# Patient Record
Sex: MALE | Race: BLACK OR AFRICAN AMERICAN | Employment: FULL TIME | URBAN - METROPOLITAN AREA
[De-identification: names, ages, dates, MRNs, and addresses within clinical notes are randomized per-mention and may not be internally consistent; named-entity substitution may affect disease eponyms.]

---

## 2019-01-04 ENCOUNTER — HOSPITAL ENCOUNTER (OUTPATIENT)
Dept: OCCUPATIONAL MEDICINE | Age: 34
Discharge: HOME | End: 2019-01-04
Attending: FAMILY MEDICINE
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
## 2019-10-01 ENCOUNTER — OFFICE VISIT (OUTPATIENT)
Dept: FAMILY MEDICINE CLINIC | Age: 34
End: 2019-10-01

## 2019-10-01 ENCOUNTER — HOME HEALTH ADMISSION (OUTPATIENT)
Dept: HOME HEALTH SERVICES | Facility: HOME HEALTH | Age: 34
End: 2019-10-01
Payer: MEDICAID

## 2019-10-01 VITALS
HEIGHT: 73 IN | SYSTOLIC BLOOD PRESSURE: 128 MMHG | WEIGHT: 185 LBS | OXYGEN SATURATION: 98 % | RESPIRATION RATE: 16 BRPM | DIASTOLIC BLOOD PRESSURE: 83 MMHG | BODY MASS INDEX: 24.52 KG/M2 | TEMPERATURE: 98.2 F | HEART RATE: 75 BPM

## 2019-10-01 DIAGNOSIS — M25.512 ACUTE PAIN OF LEFT SHOULDER: ICD-10-CM

## 2019-10-01 DIAGNOSIS — M79.604 BILATERAL LEG PAIN: ICD-10-CM

## 2019-10-01 DIAGNOSIS — M79.605 BILATERAL LEG PAIN: ICD-10-CM

## 2019-10-01 DIAGNOSIS — T07.XXXA MULTIPLE ABRASIONS: Primary | ICD-10-CM
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
## 2019-10-01 DIAGNOSIS — S49.92XA INJURY OF LEFT CLAVICLE, INITIAL ENCOUNTER: ICD-10-CM

## 2019-10-01 RX ORDER — CYCLOBENZAPRINE HCL 10 MG
10 TABLET ORAL
Qty: 60 TAB | Refills: 1 | Status: SHIPPED | OUTPATIENT
Start: 2019-10-01

## 2019-10-01 RX ORDER — NAPROXEN 500 MG/1
500 TABLET ORAL
COMMUNITY
Start: 2019-09-27 | End: 2019-10-01 | Stop reason: ALTCHOICE

## 2019-10-01 RX ORDER — IBUPROFEN 800 MG/1
800 TABLET ORAL
Qty: 90 TAB | Refills: 0 | Status: SHIPPED | OUTPATIENT
Start: 2019-10-01 | End: 2019-10-16 | Stop reason: SDUPTHER

## 2019-10-01 RX ORDER — CLINDAMYCIN HYDROCHLORIDE 300 MG/1
300 CAPSULE ORAL 3 TIMES DAILY
Qty: 30 CAP | Refills: 0 | Status: SHIPPED | OUTPATIENT
Start: 2019-10-01 | End: 2019-10-11

## 2019-10-01 RX ORDER — OXYCODONE AND ACETAMINOPHEN 5; 325 MG/1; MG/1
1 TABLET ORAL
COMMUNITY
Start: 2019-09-27 | End: 2019-10-01 | Stop reason: SDUPTHER

## 2019-10-01 RX ORDER — OXYCODONE AND ACETAMINOPHEN 5; 325 MG/1; MG/1
1 TABLET ORAL
Qty: 56 TAB | Refills: 0 | Status: SHIPPED | OUTPATIENT
Start: 2019-10-01 | End: 2019-10-16 | Stop reason: SDUPTHER

## 2019-10-01 RX ORDER — CYCLOBENZAPRINE HCL 10 MG
10 TABLET ORAL
COMMUNITY
Start: 2019-09-27 | End: 2019-10-01 | Stop reason: SDUPTHER
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
## 2019-10-02 ENCOUNTER — HOME CARE VISIT (OUTPATIENT)
Dept: SCHEDULING | Facility: HOME HEALTH | Age: 34
End: 2019-10-02
Payer: MEDICAID

## 2019-10-02 ENCOUNTER — TELEPHONE (OUTPATIENT)
Dept: FAMILY MEDICINE CLINIC | Age: 34
End: 2019-10-02

## 2019-10-02 PROCEDURE — 400013 HH SOC

## 2019-10-02 PROCEDURE — G0299 HHS/HOSPICE OF RN EA 15 MIN: HCPCS

## 2019-10-02 NOTE — TELEPHONE ENCOUNTER
Patient calling stated at his appointment yesterday he gave the nurse a fax number to have his fmla paperwork faxed over to his job. Patient is checking on status of his paperwork.     pls advise

## 2019-10-03 ENCOUNTER — HOME CARE VISIT (OUTPATIENT)
Dept: HOME HEALTH SERVICES | Facility: HOME HEALTH | Age: 34
End: 2019-10-03
Payer: MEDICAID

## 2019-10-03 PROCEDURE — A4452 WATERPROOF TAPE: HCPCS

## 2019-10-03 PROCEDURE — A6248 HYDROGEL DRSG GEL FILLER: HCPCS

## 2019-10-03 PROCEDURE — A6212 FOAM DRG <=16 SQ IN W/BORDER: HCPCS

## 2019-10-03 PROCEDURE — A6260 WOUND CLEANSER ANY TYPE/SIZE: HCPCS

## 2019-10-03 PROCEDURE — A6216 NON-STERILE GAUZE<=16 SQ IN: HCPCS

## 2019-10-04 ENCOUNTER — HOME CARE VISIT (OUTPATIENT)
Dept: SCHEDULING | Facility: HOME HEALTH | Age: 34
End: 2019-10-04
Payer: MEDICAID

## 2019-10-04 PROCEDURE — G0300 HHS/HOSPICE OF LPN EA 15 MIN: HCPCS

## 2019-10-06 VITALS
DIASTOLIC BLOOD PRESSURE: 70 MMHG | TEMPERATURE: 98.1 F | RESPIRATION RATE: 16 BRPM | OXYGEN SATURATION: 98 % | SYSTOLIC BLOOD PRESSURE: 122 MMHG | HEART RATE: 80 BPM

## 2019-10-08 ENCOUNTER — OFFICE VISIT (OUTPATIENT)
Dept: FAMILY MEDICINE CLINIC | Age: 34
End: 2019-10-08

## 2019-10-08 ENCOUNTER — HOME CARE VISIT (OUTPATIENT)
Dept: SCHEDULING | Facility: HOME HEALTH | Age: 34
End: 2019-10-08
Payer: MEDICAID

## 2019-10-08 VITALS
HEIGHT: 73 IN | TEMPERATURE: 98.1 F | BODY MASS INDEX: 26.77 KG/M2 | WEIGHT: 202 LBS | RESPIRATION RATE: 16 BRPM | DIASTOLIC BLOOD PRESSURE: 87 MMHG | SYSTOLIC BLOOD PRESSURE: 125 MMHG | HEART RATE: 79 BPM | OXYGEN SATURATION: 98 %

## 2019-10-08 VITALS
DIASTOLIC BLOOD PRESSURE: 78 MMHG | OXYGEN SATURATION: 98 % | HEART RATE: 66 BPM | TEMPERATURE: 98 F | SYSTOLIC BLOOD PRESSURE: 108 MMHG

## 2019-10-08 DIAGNOSIS — M79.604 BILATERAL LEG PAIN: ICD-10-CM

## 2019-10-08 DIAGNOSIS — S49.92XD INJURY OF LEFT CLAVICLE, SUBSEQUENT ENCOUNTER: ICD-10-CM

## 2019-10-08 DIAGNOSIS — M25.512 ACUTE PAIN OF LEFT SHOULDER: ICD-10-CM

## 2019-10-08 DIAGNOSIS — T07.XXXA MULTIPLE ABRASIONS: Primary | ICD-10-CM

## 2019-10-08 DIAGNOSIS — M79.605 BILATERAL LEG PAIN: ICD-10-CM

## 2019-10-08 NOTE — PROGRESS NOTES
HISTORY OF PRESENT ILLNESS  Alon Silva is a 30 y.o. male presents today to for wound check. HPI   Patient states he is being seen by home health 2 times per week. States left shoulder wound infection has been improved. States odor has resolved. Continuing to take percocet strictly prn. Reports he has follow up appts ortho and spine specialist.  Also expresses cocnern regarding pain to left clavicle and also shoulder. Has increased pain with barely extending his arm. Reports pain begins in left chest wall and radiates outward. Requesting to have his shoulder evaluated as well. Advised to call orthopedist and inquire if his shoulder could also be evaluated during one of his initial appts. Patient agreeable. Allergies   Allergen Reactions    Penicillin G Anaphylaxis     Current Outpatient Medications   Medication Sig Dispense Refill    clindamycin (CLEOCIN) 300 mg capsule Take 1 Cap by mouth three (3) times daily for 10 days. 30 Cap 0    oxyCODONE-acetaminophen (PERCOCET) 5-325 mg per tablet Take 1 Tab by mouth every six (6) hours as needed for Pain for up to 14 days. Max Daily Amount: 4 Tabs. 56 Tab 0    ibuprofen (MOTRIN) 800 mg tablet Take 1 Tab by mouth every eight (8) hours as needed for Pain. 90 Tab 0    cyclobenzaprine (FLEXERIL) 10 mg tablet Take 1 Tab by mouth three (3) times daily as needed for Muscle Spasm(s). 60 Tab 1     No past medical history on file.   Social History     Socioeconomic History    Marital status: UNKNOWN     Spouse name: Not on file    Number of children: Not on file    Years of education: Not on file    Highest education level: Not on file   Occupational History    Not on file   Social Needs    Financial resource strain: Not on file    Food insecurity:     Worry: Not on file     Inability: Not on file    Transportation needs:     Medical: Not on file     Non-medical: Not on file   Tobacco Use    Smoking status: Never Smoker    Smokeless tobacco: Never Used Substance and Sexual Activity    Alcohol use: Not on file    Drug use: Not on file    Sexual activity: Not on file   Lifestyle    Physical activity:     Days per week: Not on file     Minutes per session: Not on file    Stress: Not on file   Relationships    Social connections:     Talks on phone: Not on file     Gets together: Not on file     Attends Hindu service: Not on file     Active member of club or organization: Not on file     Attends meetings of clubs or organizations: Not on file     Relationship status: Not on file    Intimate partner violence:     Fear of current or ex partner: Not on file     Emotionally abused: Not on file     Physically abused: Not on file     Forced sexual activity: Not on file   Other Topics Concern    Not on file   Social History Narrative    Not on file     Wt Readings from Last 3 Encounters:   10/08/19 202 lb (91.6 kg)   10/01/19 185 lb (83.9 kg)     BP Readings from Last 3 Encounters:   10/08/19 125/87   10/04/19 108/78   10/02/19 122/70     Review of Systems   Constitutional: Negative for chills and fever. Respiratory: Negative for shortness of breath. Cardiovascular: Negative for chest pain and palpitations. Skin:        Multiple abrasions   Neurological: Negative for dizziness and headaches. /87   Pulse 79   Temp 98.1 °F (36.7 °C) (Oral)   Resp 16   Ht 6' 1\" (1.854 m)   Wt 202 lb (91.6 kg)   SpO2 98%   BMI 26.65 kg/m²      Physical Exam   Constitutional: He is oriented to person, place, and time. He appears well-developed and well-nourished. HENT:   Head: Normocephalic and atraumatic. Neck: Normal range of motion. Neck supple. Cardiovascular: Normal rate, regular rhythm and normal heart sounds. Exam reveals no gallop and no friction rub. No murmur heard. Pulmonary/Chest: Effort normal and breath sounds normal. He has no wheezes. He has no rhonchi. He has no rales.    Musculoskeletal:        Left shoulder: He exhibits tenderness (anteriorly). Neurological: He is alert and oriented to person, place, and time. Skin: Skin is warm and dry. Abrasion (multiple to left shoulder, bilateral anterior legs, knees, posterior hands, left forearm, and back, granulation and epithelizing tissue present) noted. Dressing C/D/I to bilateral lower extremities       ASSESSMENT and PLAN    ICD-10-CM ICD-9-CM    1. Multiple abrasions T07. XXXA 919.0    2. Bilateral leg pain M79.604 729.5     M79.605     3. Acute pain of left shoulder M25.512 719.41    4. Injury of left clavicle, subsequent encounter S49. 92XD V58.89      959.2      I have discussed the diagnosis with the patient and the intended plan as seen in the above orders. The patient has received an after-visit summary and questions were answered concerning future plans. I have discussed medication side effects and warnings with the patient as well. Patient agreeable with above plan and verbalizes understanding. Follow-up and Dispositions    · Return in about 4 weeks (around 11/5/2019) for abrasions.

## 2019-10-08 NOTE — PATIENT INSTRUCTIONS
Scrapes (Abrasions): Care Instructions  Your Care Instructions  Scrapes (abrasions) are wounds where your skin has been rubbed or torn off. Most scrapes do not go deep into the skin, but some may remove several layers of skin. Scrapes usually don't bleed much, but they may ooze pinkish fluid. Scrapes on the head or face may appear worse than they are. They may bleed a lot because of the good blood supply to this area. Most scrapes heal well and may not need a bandage. They usually heal within 3 to 7 days. A large, deep scrape may take 1 to 2 weeks or longer to heal. A scab may form on some scrapes. Follow-up care is a key part of your treatment and safety. Be sure to make and go to all appointments, and call your doctor if you are having problems. It's also a good idea to know your test results and keep a list of the medicines you take. How can you care for yourself at home? · If your doctor told you how to care for your wound, follow your doctor's instructions. If you did not get instructions, follow this general advice:  ? Wash the scrape with clean water 2 times a day. Don't use hydrogen peroxide or alcohol, which can slow healing. ? You may cover the scrape with a thin layer of petroleum jelly, such as Vaseline, and a nonstick bandage. ? Apply more petroleum jelly and replace the bandage as needed. · Prop up the injured area on a pillow anytime you sit or lie down during the next 3 days. Try to keep it above the level of your heart. This will help reduce swelling. · Be safe with medicines. Take pain medicines exactly as directed. ? If the doctor gave you a prescription medicine for pain, take it as prescribed. ? If you are not taking a prescription pain medicine, ask your doctor if you can take an over-the-counter medicine. When should you call for help?   Call your doctor now or seek immediate medical care if:    · You have signs of infection, such as:  ? Increased pain, swelling, warmth, or redness around the scrape. ? Red streaks leading from the scrape. ? Pus draining from the scrape. ? A fever.     · The scrape starts to bleed, and blood soaks through the bandage. Oozing small amounts of blood is normal.    Watch closely for changes in your health, and be sure to contact your doctor if the scrape is not getting better each day. Where can you learn more? Go to http://maria a-gage.info/. Enter A374 in the search box to learn more about \"Scrapes (Abrasions): Care Instructions. \"  Current as of: June 26, 2019  Content Version: 12.2  © 7782-6100 [employer]. Care instructions adapted under license by [employer] (which disclaims liability or warranty for this information). If you have questions about a medical condition or this instruction, always ask your healthcare professional. [place] [address] any warranty or liability for your use of this information.
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
## 2019-10-11 ENCOUNTER — HOME CARE VISIT (OUTPATIENT)
Dept: SCHEDULING | Facility: HOME HEALTH | Age: 34
End: 2019-10-11
Payer: MEDICAID

## 2019-10-11 PROCEDURE — G0299 HHS/HOSPICE OF RN EA 15 MIN: HCPCS

## 2019-10-12 VITALS
OXYGEN SATURATION: 98 % | TEMPERATURE: 97.8 F | DIASTOLIC BLOOD PRESSURE: 74 MMHG | SYSTOLIC BLOOD PRESSURE: 113 MMHG | RESPIRATION RATE: 18 BRPM | HEART RATE: 70 BPM

## 2019-10-14 ENCOUNTER — HOME CARE VISIT (OUTPATIENT)
Dept: SCHEDULING | Facility: HOME HEALTH | Age: 34
End: 2019-10-14
Payer: MEDICAID

## 2019-10-14 ENCOUNTER — DOCUMENTATION ONLY (OUTPATIENT)
Dept: ORTHOPEDIC SURGERY | Facility: CLINIC | Age: 34
End: 2019-10-14

## 2019-10-14 ENCOUNTER — OFFICE VISIT (OUTPATIENT)
Dept: ORTHOPEDIC SURGERY | Facility: CLINIC | Age: 34
End: 2019-10-14

## 2019-10-14 VITALS
RESPIRATION RATE: 16 BRPM | TEMPERATURE: 97.1 F | HEIGHT: 73 IN | HEART RATE: 79 BPM | BODY MASS INDEX: 25.84 KG/M2 | SYSTOLIC BLOOD PRESSURE: 113 MMHG | OXYGEN SATURATION: 98 % | DIASTOLIC BLOOD PRESSURE: 67 MMHG | WEIGHT: 195 LBS

## 2019-10-14 DIAGNOSIS — S63.502A SPRAIN OF LEFT WRIST, INITIAL ENCOUNTER: ICD-10-CM

## 2019-10-14 DIAGNOSIS — S16.1XXA STRAIN OF NECK MUSCLE, INITIAL ENCOUNTER: Primary | ICD-10-CM

## 2019-10-14 DIAGNOSIS — S93.432A SPRAIN OF TIBIOFIBULAR LIGAMENT OF LEFT ANKLE, INITIAL ENCOUNTER: ICD-10-CM

## 2019-10-14 DIAGNOSIS — S80.02XA CONTUSION OF LEFT KNEE, INITIAL ENCOUNTER: ICD-10-CM

## 2019-10-14 DIAGNOSIS — S80.819A ABRASION OF ANTERIOR LOWER LEG, UNSPECIFIED LATERALITY, INITIAL ENCOUNTER: ICD-10-CM

## 2019-10-14 DIAGNOSIS — S93.511A SPRAIN OF INTERPHALANGEAL JOINT OF RIGHT GREAT TOE, INITIAL ENCOUNTER: ICD-10-CM

## 2019-10-14 PROCEDURE — G0300 HHS/HOSPICE OF LPN EA 15 MIN: HCPCS
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
## 2019-10-14 NOTE — PROGRESS NOTES
1. Have you been to the ER, urgent care clinic since your last visit? Hospitalized since your last visit? Yes, [facility] ED    2. Have you seen or consulted any other health care providers outside of the [address] [place] [address] since your last visit? Include any pap smears or colon screening.    NO

## 2019-10-16 VITALS
TEMPERATURE: 97.4 F | HEART RATE: 100 BPM | OXYGEN SATURATION: 97 % | SYSTOLIC BLOOD PRESSURE: 115 MMHG | DIASTOLIC BLOOD PRESSURE: 70 MMHG

## 2019-10-17 ENCOUNTER — HOSPITAL ENCOUNTER (OUTPATIENT)
Dept: PHYSICAL THERAPY | Age: 34
Discharge: HOME | End: 2019-10-17
Payer: MEDICAID

## 2019-10-17 DIAGNOSIS — M79.604 BILATERAL LEG PAIN: ICD-10-CM
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
## 2019-10-17 DIAGNOSIS — S49.92XA INJURY OF LEFT CLAVICLE, INITIAL ENCOUNTER: ICD-10-CM

## 2019-10-17 DIAGNOSIS — M25.512 ACUTE PAIN OF LEFT SHOULDER: ICD-10-CM

## 2019-10-17 DIAGNOSIS — M79.605 BILATERAL LEG PAIN: ICD-10-CM

## 2019-10-17 DIAGNOSIS — T07.XXXA MULTIPLE ABRASIONS: ICD-10-CM

## 2019-10-17 PROCEDURE — 97162 PT EVAL MOD COMPLEX 30 MIN: CPT

## 2019-10-17 PROCEDURE — 97110 THERAPEUTIC EXERCISES: CPT

## 2019-10-17 RX ORDER — OXYCODONE AND ACETAMINOPHEN 5; 325 MG/1; MG/1
1 TABLET ORAL
Qty: 56 TAB | Refills: 0 | Status: CANCELLED | OUTPATIENT
Start: 2019-10-17 | End: 2019-10-31

## 2019-10-17 RX ORDER — IBUPROFEN 800 MG/1
800 TABLET ORAL
Qty: 90 TAB | Refills: 0 | Status: CANCELLED | OUTPATIENT
Start: 2019-10-17

## 2019-10-17 NOTE — PROGRESS NOTES
PT DAILY TREATMENT NOTE 10-18    Patient Name: Mitzi Hoyt  Date:10/17/2019  : 1985  [x]  Patient  Verified  Payor: Fiona Perea / Plan: [facility] / Product Type: Managed Care Medicaid /    In time:9:50  Out time:10:35  Total Treatment Time (min): 45  Visit #: 1 of 10      Medicare/BCBS Only   Total Timed Codes (min):   1:1 Treatment Time:         Treatment Area: Pain in right knee [M25.561]  Pain in left knee [M25.562]  Right shoulder pain [M25.511]  Left shoulder pain [M25.512]  Right ankle pain [M25.571]  Left ankle pain [M25.572]  Left wrist pain [M25.532]     SUBJECTIVE  Pain Level (0-10 scale): 8/10  Any medication changes, allergies to medications, adverse drug reactions, diagnosis change, or new procedure performed?: [x] No    [] Yes (see summary sheet for update)  Subjective functional status/changes:   [x] See paper initial evaluation form in patient chart.       OBJECTIVE    30 min [x]Eval                  []Re-Eval     15 min Therapeutic Exercise:  [] See flow sheet : diagnosis; prognosis; HEP given and reviewed with Pt   Rationale: increase ROM to improve the patients ability to improve rest at night, mobility for ADLs    With   [x] TE   [] TA   [] neuro   [] other: Patient Education: [x] Review HEP    [] Progressed/Changed HEP based on:   [] positioning   [] body mechanics   [] transfers   [] heat/ice application    [] other:      Other Objective/Functional Measures: FOTO 31 pts     Pain Level (0-10 scale) post treatment: 8/10    ASSESSMENT/Changes in Function:     Patient will continue to benefit from skilled PT services to address functional mobility deficits, address ROM deficits, address strength deficits, analyze and address soft tissue restrictions, analyze and cue movement patterns, analyze and modify body mechanics/ergonomics, assess and modify postural abnormalities, address imbalance/dizziness and instruct in home and community integration to attain remaining goals.      [x]  See Plan of Care  []  See progress note/recertification  []  See Discharge Summary         PLAN  []  Upgrade activities as tolerated     [x]  Continue plan of care  []  Update interventions per flow sheet       []  Discharge due to:_  []  Other:_      Tri Higgins, PT 10/17/2019  1:20 PM
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
## 2019-10-19 ENCOUNTER — HOME CARE VISIT (OUTPATIENT)
Dept: SCHEDULING | Facility: HOME HEALTH | Age: 34
End: 2019-10-19
Payer: MEDICAID

## 2019-10-19 VITALS
OXYGEN SATURATION: 99 % | HEART RATE: 77 BPM | DIASTOLIC BLOOD PRESSURE: 76 MMHG | SYSTOLIC BLOOD PRESSURE: 110 MMHG | TEMPERATURE: 98.7 F

## 2019-10-19 PROCEDURE — G0299 HHS/HOSPICE OF RN EA 15 MIN: HCPCS

## 2019-10-29 ENCOUNTER — OFFICE VISIT (OUTPATIENT)
Dept: ORTHOPEDIC SURGERY | Age: 34
End: 2019-10-29

## 2019-10-29 VITALS
HEART RATE: 73 BPM | OXYGEN SATURATION: 98 % | RESPIRATION RATE: 16 BRPM | DIASTOLIC BLOOD PRESSURE: 73 MMHG | WEIGHT: 196.4 LBS | BODY MASS INDEX: 26.03 KG/M2 | TEMPERATURE: 97.9 F | HEIGHT: 73 IN | SYSTOLIC BLOOD PRESSURE: 124 MMHG

## 2019-10-29 DIAGNOSIS — S16.1XXS STRAIN OF NECK MUSCLE, SEQUELA: ICD-10-CM

## 2019-10-29 DIAGNOSIS — M54.12 CERVICAL NEURITIS: ICD-10-CM

## 2019-10-29 DIAGNOSIS — S13.130S: Primary | ICD-10-CM

## 2019-10-29 RX ORDER — IBUPROFEN 800 MG/1
800 TABLET ORAL
Qty: 45 TAB | Refills: 0 | Status: SHIPPED | OUTPATIENT
Start: 2019-10-29 | End: 2019-12-20 | Stop reason: SDUPTHER

## 2019-10-29 RX ORDER — GABAPENTIN 300 MG/1
300 CAPSULE ORAL 3 TIMES DAILY
Qty: 90 CAP | Refills: 1 | Status: SHIPPED | OUTPATIENT
Start: 2019-10-29 | End: 2019-12-20 | Stop reason: SDUPTHER

## 2019-10-29 RX ORDER — METHYLPREDNISOLONE 4 MG/1
TABLET ORAL
Qty: 1 DOSE PACK | Refills: 0 | Status: SHIPPED | OUTPATIENT
Start: 2019-10-29 | End: 2019-11-15 | Stop reason: ALTCHOICE

## 2019-10-29 NOTE — LETTER
NOTIFICATION OF RETURN TO WORK / SCHOOL 
 
10/29/2019 10:00 AM 
 
Mr. Horace Gutierrez [address] [facility] [address] Dontae Paz To Whom It May Concern: 
 
Horace Gutierrez was under the care of [address] today 10-29-19. Mr. Keenan Polanco is to remain out of work until his follow up on 11-15-19 . If you have any questions please call the office at [phone]. Sincerely, Odalis Temple MD

## 2019-10-29 NOTE — LETTER
10/29/19 Patient: Eldon Zarate YOB: 1985 Date of Visit: 10/29/2019 Sherif Mejia NP 
[address] [facility] [address] [facility] [address] VIA In Basket Dear Sherif Mejia NP, Thank you for referring Mr. Eldon Zarate to [address] for evaluation. My notes for this consultation are attached. If you have questions, please do not hesitate to call me. I look forward to following your patient along with you. Sincerely, Dar Miller MD
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
## 2019-10-31 ENCOUNTER — HOSPITAL ENCOUNTER (OUTPATIENT)
Dept: PHYSICAL THERAPY | Age: 34
Discharge: HOME | End: 2019-10-31
Payer: MEDICAID

## 2019-10-31 PROCEDURE — 97113 AQUATIC THERAPY/EXERCISES: CPT
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
## 2019-11-04 DIAGNOSIS — M79.604 BILATERAL LEG PAIN: ICD-10-CM

## 2019-11-04 DIAGNOSIS — M79.605 BILATERAL LEG PAIN: ICD-10-CM

## 2019-11-04 DIAGNOSIS — M25.512 ACUTE PAIN OF LEFT SHOULDER: ICD-10-CM

## 2019-11-04 DIAGNOSIS — S49.92XA INJURY OF LEFT CLAVICLE, INITIAL ENCOUNTER: ICD-10-CM

## 2019-11-04 DIAGNOSIS — T07.XXXA MULTIPLE ABRASIONS: ICD-10-CM

## 2019-11-05 ENCOUNTER — HOSPITAL ENCOUNTER (OUTPATIENT)
Dept: PHYSICAL THERAPY | Age: 34
Discharge: HOME | End: 2019-11-05
Payer: MEDICAID

## 2019-11-05 PROCEDURE — 97113 AQUATIC THERAPY/EXERCISES: CPT

## 2019-11-07 ENCOUNTER — HOSPITAL ENCOUNTER (OUTPATIENT)
Dept: PHYSICAL THERAPY | Age: 34
Discharge: HOME | End: 2019-11-07
Payer: MEDICAID

## 2019-11-07 PROCEDURE — 97113 AQUATIC THERAPY/EXERCISES: CPT

## 2019-11-07 RX ORDER — IBUPROFEN 800 MG/1
800 TABLET ORAL
Qty: 90 TAB | Refills: 0 | OUTPATIENT
Start: 2019-11-07

## 2019-11-07 RX ORDER — OXYCODONE AND ACETAMINOPHEN 5; 325 MG/1; MG/1
1 TABLET ORAL
Qty: 42 TAB | Refills: 0 | Status: SHIPPED | OUTPATIENT
Start: 2019-11-07 | End: 2019-12-03 | Stop reason: SDUPTHER

## 2019-11-07 NOTE — TELEPHONE ENCOUNTER
Percocet has been approved. Ibuprofen was refilled by ortho last week. Please inform patient I will send him to pain management. I can not continue to manage his pain. Thanks! I have reviewed the patients controlled substance prescription history, as maintained in the [facility]. There is no suspicious activity noted. Usage is consistent with current use of prescribed medications.

## 2019-11-07 NOTE — TELEPHONE ENCOUNTER
Requested Prescriptions     Pending Prescriptions Disp Refills    oxyCODONE-acetaminophen (PERCOCET) 5-325 mg per tablet 42 Tab 0     Sig: Take 1 Tab by mouth every eight (8) hours as needed for Pain for up to 14 days. Max Daily Amount: 3 Tabs.  ibuprofen (MOTRIN) 800 mg tablet 90 Tab 0     Sig: Take 1 Tab by mouth every eight (8) hours as needed for Pain. Patient called to check the status of his refill.

## 2019-11-08 ENCOUNTER — OFFICE VISIT (OUTPATIENT)
Dept: FAMILY MEDICINE CLINIC | Age: 34
End: 2019-11-08

## 2019-11-08 ENCOUNTER — HOSPITAL ENCOUNTER (OUTPATIENT)
Dept: MRI IMAGING | Age: 34
Discharge: HOME | End: 2019-11-08
Attending: PHYSICAL MEDICINE & REHABILITATION
Payer: MEDICAID

## 2019-11-08 VITALS
TEMPERATURE: 98.7 F | BODY MASS INDEX: 25.98 KG/M2 | HEART RATE: 84 BPM | SYSTOLIC BLOOD PRESSURE: 120 MMHG | WEIGHT: 196 LBS | HEIGHT: 73 IN | RESPIRATION RATE: 16 BRPM | DIASTOLIC BLOOD PRESSURE: 68 MMHG | OXYGEN SATURATION: 98 %

## 2019-11-08 DIAGNOSIS — S16.1XXS STRAIN OF NECK MUSCLE, SEQUELA: ICD-10-CM

## 2019-11-08 DIAGNOSIS — M79.604 BILATERAL LEG PAIN: ICD-10-CM

## 2019-11-08 DIAGNOSIS — M54.12 CERVICAL NEURITIS: ICD-10-CM

## 2019-11-08 DIAGNOSIS — T07.XXXA MULTIPLE ABRASIONS: Primary | ICD-10-CM

## 2019-11-08 DIAGNOSIS — M79.605 BILATERAL LEG PAIN: ICD-10-CM

## 2019-11-08 DIAGNOSIS — S13.130S: ICD-10-CM

## 2019-11-08 DIAGNOSIS — R20.2 PARESTHESIA: ICD-10-CM

## 2019-11-08 DIAGNOSIS — M25.512 ACUTE PAIN OF LEFT SHOULDER: ICD-10-CM

## 2019-11-08 PROCEDURE — 72141 MRI NECK SPINE W/O DYE: CPT

## 2019-11-08 NOTE — PROGRESS NOTES
HISTORY OF PRESENT ILLNESS  Girma Davis is a 30 y.o. male. Patient states he does feel better since taking medrol dose pack. States pain in his knees, left ankle is better. He continues to have bilateral wrist, right ankle and shoulder has been dull. Has MRI scheduled today. Allergies   Allergen Reactions    Penicillin G Anaphylaxis     Current Outpatient Medications   Medication Sig Dispense Refill    oxyCODONE-acetaminophen (PERCOCET) 5-325 mg per tablet Take 1 Tab by mouth every eight (8) hours as needed for Pain for up to 14 days. Max Daily Amount: 3 Tabs. 42 Tab 0    methylPREDNISolone (MEDROL DOSEPACK) 4 mg tablet Per dose pack instructions 1 Dose Pack 0    ibuprofen (MOTRIN) 800 mg tablet Take 1 Tab by mouth three (3) times daily (with meals). 45 Tab 0    gabapentin (NEURONTIN) 300 mg capsule Take 1 Cap by mouth three (3) times daily. Max Daily Amount: 900 mg. 90 Cap 1    ibuprofen (MOTRIN) 800 mg tablet Take 1 Tab by mouth every eight (8) hours as needed for Pain. 90 Tab 0    cyclobenzaprine (FLEXERIL) 10 mg tablet Take 1 Tab by mouth three (3) times daily as needed for Muscle Spasm(s). 60 Tab 1     No past medical history on file.   Social History     Socioeconomic History    Marital status:      Spouse name: Not on file    Number of children: Not on file    Years of education: Not on file    Highest education level: Not on file   Occupational History    Not on file   Social Needs    Financial resource strain: Not on file    Food insecurity:     Worry: Not on file     Inability: Not on file    Transportation needs:     Medical: Not on file     Non-medical: Not on file   Tobacco Use    Smoking status: Never Smoker    Smokeless tobacco: Never Used   Substance and Sexual Activity    Alcohol use: Never     Frequency: Never    Drug use: Never    Sexual activity: Not on file   Lifestyle    Physical activity:     Days per week: Not on file     Minutes per session: Not on file  Stress: Not on file   Relationships    Social connections:     Talks on phone: Not on file     Gets together: Not on file     Attends Hinduism service: Not on file     Active member of club or organization: Not on file     Attends meetings of clubs or organizations: Not on file     Relationship status: Not on file    Intimate partner violence:     Fear of current or ex partner: Not on file     Emotionally abused: Not on file     Physically abused: Not on file     Forced sexual activity: Not on file   Other Topics Concern     Service Not Asked    Blood Transfusions Not Asked    Caffeine Concern Not Asked    Occupational Exposure Not Asked   [place] Hazards Not Asked    Sleep Concern Not Asked    Stress Concern Not Asked    Weight Concern Not Asked    Special Diet Not Asked    Back Care Not Asked    Exercise Not Asked    Bike Helmet Not Asked   [address] Not Asked    Self-Exams Not Asked   Social History Narrative    Not on file     Wt Readings from Last 3 Encounters:   11/08/19 196 lb (88.9 kg)   10/29/19 196 lb 6.4 oz (89.1 kg)   10/14/19 195 lb (88.5 kg)     BP Readings from Last 3 Encounters:   11/08/19 120/68   10/29/19 124/73   10/19/19 110/76     Review of Systems   Constitutional: Negative for chills and fever. Respiratory: Negative for shortness of breath. Cardiovascular: Negative for chest pain and palpitations. Skin:        Multiple abrasions   Neurological: Negative for dizziness and headaches. /68   Pulse 84   Temp 98.7 °F (37.1 °C) (Oral)   Resp 16   Ht 6' 1\" (1.854 m)   Wt 196 lb (88.9 kg)   SpO2 98%   BMI 25.86 kg/m²     Physical Exam   Constitutional: He is oriented to person, place, and time. He appears well-developed and well-nourished. HENT:   Head: Normocephalic and atraumatic. Neck: Normal range of motion. Neck supple. Cardiovascular: Normal rate, regular rhythm and normal heart sounds. Exam reveals no gallop and no friction rub. No murmur heard. Pulmonary/Chest: Effort normal and breath sounds normal. He has no wheezes. He has no rhonchi. He has no rales. Musculoskeletal:        Left shoulder: He exhibits tenderness (anteriorly). Neurological: He is alert and oriented to person, place, and time. Skin: Skin is warm and dry. Abrasion (multiple to left shoulder, bilateral anterior legs, knees, posterior hands, left forearm, and back, granulation and epithelizing tissue present) noted. ASSESSMENT and PLAN    ICD-10-CM ICD-9-CM    1. Multiple abrasions T07. XXXA 919.0    2. Bilateral leg pain M79.604 729.5 REFERRAL TO PAIN MANAGEMENT    M79.605     3. Paresthesia R20.2 782.0    4. Acute pain of left shoulder M25.512 719.41 REFERRAL TO PAIN MANAGEMENT       Above gradually improving  I have discussed the diagnosis with the patient and the intended plan as seen in the above orders. The patient has received an after-visit summary and questions were answered concerning future plans. I have discussed medication side effects and warnings with the patient as well. Patient agreeable with above plan and verbalizes understanding. Follow-up and Dispositions    · Return in about 4 weeks (around 12/6/2019) for pain.

## 2019-11-08 NOTE — PROGRESS NOTES
Mitzi Hoyt presents today for   Chief Complaint   Patient presents with    Follow-up       Is someone accompanying this pt? no    Is the patient using any DME equipment during OV? no    Depression Screening:  3 most recent PHQ Screens 10/29/2019   Little interest or pleasure in doing things Not at all   Feeling down, depressed, irritable, or hopeless Not at all   Total Score PHQ 2 0       Learning Assessment:  Learning Assessment 10/1/2019   PRIMARY LEARNER Patient   PRIMARY LANGUAGE ENGLISH   LEARNER PREFERENCE PRIMARY LISTENING   ANSWERED BY patient   RELATIONSHIP SELF       Abuse Screening:  No flowsheet data found. Fall Risk  No flowsheet data found. Health Maintenance reviewed and discussed and ordered per Provider. Health Maintenance Due   Topic Date Due    Influenza Age 5 to Adult  08/01/2019           Coordination of Care:  1. Have you been to the ER, urgent care clinic since your last visit? Hospitalized since your last visit? no    2. Have you seen or consulted any other health care providers outside of the [address] [place] [address] since your last visit? Include any pap smears or colon screening.  no

## 2019-11-08 NOTE — PATIENT INSTRUCTIONS
Numbness and Tingling: Care Instructions  Your Care Instructions    Many things can cause numbness or tingling. Swelling may put pressure on a nerve. This could cause you to lose feeling or have a pins-and-needles sensation on part of your body. Nerves may be damaged from trauma, toxins, or diseases, such as diabetes or multiple sclerosis (MS). Sometimes, though, the cause is not clear. If there is no clear reason for your symptoms, and you are not having any other symptoms, your doctor may suggest watching and waiting for a while to see if the numbness or tingling goes away on its own. Your doctor may want you to have blood or nerve tests to find the cause of your symptoms. Follow-up care is a key part of your treatment and safety. Be sure to make and go to all appointments, and call your doctor if you are having problems. It's also a good idea to know your test results and keep a list of the medicines you take. How can you care for yourself at home? · If your doctor prescribes medicine, take it exactly as directed. Call your doctor if you think you are having a problem with your medicine. · If you have any swelling, put ice or a cold pack on the area for 10 to 20 minutes at a time. Put a thin cloth between the ice and your skin. When should you call for help? Call 911 anytime you think you may need emergency care. For example, call if:    · You have weakness, numbness, or tingling in both legs.     · You lose bowel or bladder control.     · You have symptoms of a stroke. These may include:  ? Sudden numbness, tingling, weakness, or loss of movement in your face, arm, or leg, especially on only one side of your body. ? Sudden vision changes. ? Sudden trouble speaking. ? Sudden confusion or trouble understanding simple statements. ? Sudden problems with walking or balance.   ? A sudden, severe headache that is different from past headaches.    Watch closely for changes in your health, and be sure to contact your doctor if you have any problems, or if:    · You do not get better as expected. Where can you learn more? Go to http://maria a-gage.info/. Enter J911 in the search box to learn more about \"Numbness and Tingling: Care Instructions. \"  Current as of: March 28, 2019  Content Version: 12.2  © 4327-4407 [employer]. Care instructions adapted under license by [employer] (which disclaims liability or warranty for this information). If you have questions about a medical condition or this instruction, always ask your healthcare professional. [place] [address] any warranty or liability for your use of this information.

## 2019-11-12 ENCOUNTER — HOSPITAL ENCOUNTER (OUTPATIENT)
Dept: PHYSICAL THERAPY | Age: 34
Discharge: HOME | End: 2019-11-12
Payer: MEDICAID

## 2019-11-12 PROCEDURE — 97113 AQUATIC THERAPY/EXERCISES: CPT

## 2019-11-14 ENCOUNTER — HOSPITAL ENCOUNTER (OUTPATIENT)
Dept: PHYSICAL THERAPY | Age: 34
Discharge: HOME | End: 2019-11-14
Payer: MEDICAID

## 2019-11-14 PROCEDURE — 97113 AQUATIC THERAPY/EXERCISES: CPT

## 2019-11-14 NOTE — PROGRESS NOTES
[facility], [address] [place] [address]  [phone] fax    Progress Note  Patient name: Susie Morales Start of Care: 10/17/2019   Referral source: Zaynab Waller MD : 1985                Medical Diagnosis: Pain in right knee [M25.561]  Pain in left knee [M25.562]  Right shoulder pain [M25.511]  Left shoulder pain [M25.512]  Right ankle pain [M25.571]  Left ankle pain [M25.572]  Left wrist pain [M25.532]  Payor: [facility] MEDICAID / Plan: [address] [place] [address] / Product Type: Managed Care Medicaid /  Onset Date:19                Treatment Diagnosis: Neck, Back, Left Shoulder, Wrist, B Knee, Ankle pain   Prior Hospitalization: see medical history Provider#: [id number]   Medications: Verified on Patient summary List    Comorbidities: None reported   Prior Level of Function: 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 Status at last note/certification: Established and reviewed with Pt  Current: MET. Pt. reports compliance. Goal: Pt to initiate aquatics program without increased pain to aid in achievement of all LTGs. Status at last note/certification: N/A  Current: MET. Pt continues to have decreased pain after aquatic therapy. Long Term Goals: To be accomplished in 5 weeks:  Goal: Pt to be able to  and squeeze toothpaste tube with left hand for ease with grooming in the morning. Status at last note/certification: limited a lot in performing  Current:Progressing. limited a little.   Goal: Pt to amb household distances with LRAD and minimal pain to be able to perform cooking and cleaning with ease. Status at last note/certification: pain with WB on right LE; using FWW for all amb  Current: Progressing. Pt. Uses SPC with community ambulation and no AD while at home. Goal: Pt to report < 5/10 pain at worst to be able to sleep at least 6 hrs uninterrupted for restorative rest.  Status at last note/certification: 53/63 pain at worst; less than 3 hrs of sleep  Current: Progressing. Worst pain 6/10. Goal: Pt to report FOTO score of 49 pts to show improved function and quality of life. Status at last note/certification: FOTO 31 pts   Current: Progressing. FOTO 45    Key Functional Changes:   Functional Gains: Able to now  and squeeze toothpaste, able to ambulate with SPC, decreased overall pain when performing ADLs and sleeping. Functional Deficits: Constant left ankle swelling, increased pain in left wrist when pushing a table. % improvement: 40%  Pain   Average: 6/10       Best: 3/10     Worst: 10/10  Patient Goal: \"Heal up and get back to normal.\"    Mr. Kori Marcos has been a pleasure to work with. He reports 40% improvement since starting therapy and is now sleeping better with less pain. He is now able to ambulate with a SPC in community distances without LOB. He continues to have constant left ankle swelling, but overall feels stronger and more stable since being in therapy. He plans to attend 3 more aquatic visits and then transition to land for 7 visits. He has met STGs but is still progressing toward LTGs as identified above. Updated Goals: to be achieved in 10 treatments:  Goal: Pt to be able to  and squeeze toothpaste tube with left hand for ease with grooming in the morning. Status at last note/certification: limited a little. Goal: Pt to amb household distances with LRAD and minimal pain to be able to perform cooking and cleaning with ease. Status at last note/certification: Pt. Uses SPC with community ambulation and no AD while at home.   Goal: Pt to report < 5/10 pain at worst to be able to sleep at least 6 hrs uninterrupted for restorative rest.  Status at last note/certification: Worst pain 6/10. Goal: Pt to report FOTO score of 49 pts to show improved function and quality of life. Status at last note/certification: FOTO 45    ASSESSMENT/RECOMMENDATIONS:  [x]Continue therapy per initial plan/protocol at a frequency of  2 x per week for 5 weeks  []Continue therapy with the following recommended changes:_____________________      _____________________________________________________________________  []Discontinue therapy progressing towards or have reached established goals  []Discontinue therapy due to lack of appreciable progress towards goals  []Discontinue therapy due to lack of attendance or compliance  []Await Physician's recommendations/decisions regarding therapy  []Other:________________________________________________________________    Thank you for this referral.   Abdon Strickland PT, DPT 11/14/2019 10:21 AM  NOTE TO PHYSICIAN:  PLEASE COMPLETE THE ORDERS BELOW AND   FAX TO [facility]: [phone]  If you are unable to process this request in 24 hours please contact our office: [phone]  []  I have read the above report and request that my patient continue as recommended. []  I have read the above report and request that my patient continue therapy with the following changes/special instructions:________________________________________  []I have read the above report and request that my patient be discharged from therapy.     [de-identified] Signature:____________Date:_________TIME:________    [facility], Date and Time must be completed for valid certification **

## 2019-11-15 ENCOUNTER — DOCUMENTATION ONLY (OUTPATIENT)
Dept: ORTHOPEDIC SURGERY | Age: 34
End: 2019-11-15

## 2019-11-15 ENCOUNTER — OFFICE VISIT (OUTPATIENT)
Dept: ORTHOPEDIC SURGERY | Age: 34
End: 2019-11-15

## 2019-11-15 VITALS
HEIGHT: 73 IN | OXYGEN SATURATION: 98 % | HEART RATE: 90 BPM | RESPIRATION RATE: 12 BRPM | WEIGHT: 199 LBS | DIASTOLIC BLOOD PRESSURE: 66 MMHG | SYSTOLIC BLOOD PRESSURE: 120 MMHG | TEMPERATURE: 98.1 F | BODY MASS INDEX: 26.37 KG/M2

## 2019-11-15 DIAGNOSIS — S16.1XXS STRAIN OF NECK MUSCLE, SEQUELA: ICD-10-CM

## 2019-11-15 DIAGNOSIS — M54.12 CERVICAL NEURITIS: Primary | ICD-10-CM
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
## 2019-11-15 NOTE — LETTER
NOTIFICATION OF RETURN TO WORK / SCHOOL 
 
11/15/2019 1:54 PM 
 
Mr. Rishabh Bravo [place] [address] [facility] [address] Delio Angry To Whom It May Concern: 
 
Rishabh Bravo was under the care of [address] Today 11-15-19. Mr. Andrew Oviedo is to remain out of work until his follow up appointment on 12-20-19 . If you have any questions please call the office at [phone]. Sincerely, Randa Negrete MD

## 2019-11-15 NOTE — PROGRESS NOTES
EMG MEETAE is scheduled with Dr. Salome Perez, [address], [phone] on 12/06/19, arrive 10:15AM, test 10:45AM.

## 2019-11-15 NOTE — LETTER
11/15/19 Patient: Susie Morales YOB: 1985 Date of Visit: 11/15/2019 Jacques Rebolledo NP 
[address] [facility] [address] [facility] [address] VIA In Basket Dear Jacques Rebolledo NP, Thank you for referring Mr. Susie Morales to [address] for evaluation. My notes for this consultation are attached. If you have questions, please do not hesitate to call me. I look forward to following your patient along with you. Sincerely, Sweetie Tay MD

## 2019-11-19 ENCOUNTER — TELEPHONE (OUTPATIENT)
Dept: ORTHOPEDIC SURGERY | Age: 34
End: 2019-11-19

## 2019-11-19 ENCOUNTER — HOSPITAL ENCOUNTER (OUTPATIENT)
Dept: PHYSICAL THERAPY | Age: 34
Discharge: HOME | End: 2019-11-19
Payer: MEDICAID

## 2019-11-19 PROCEDURE — 97113 AQUATIC THERAPY/EXERCISES: CPT

## 2019-11-19 NOTE — LETTER
NOTIFICATION OF RETURN TO WORK / SCHOOL 
 
11/22/2019 2:45 PM 
 
Mr. Gianna Hanson [address] [facility] [address] Kalyani Donovan To Whom It May Concern: 
 
Gianna Hanson was under the care of [address] 11-15-19. Mr. Teddy Kim is to return to with with the following restrictions of light duty until his follow up appointment on 12-20-19 . If you have any questions please call the office at [phone]. Sincerely, Ruby Gaspar MD

## 2019-11-19 NOTE — TELEPHONE ENCOUNTER
Patient called for [employer]. Patient said that they were unsure if the patient employer would approve for him to work light duty. Patient said his employer will accept light duty. Patient would like to know if he is cleared for Light Duty, if so he will need a letter for work. Patient tel. [phone].

## 2019-11-19 NOTE — TELEPHONE ENCOUNTER
Per Dr Britt Victoria note  \"I will provide him with an OOW note until next OV. \"    Discuss with Dr Lucero Mary and if given light duty, please give note stating restrictions

## 2019-11-21 ENCOUNTER — HOSPITAL ENCOUNTER (OUTPATIENT)
Dept: PHYSICAL THERAPY | Age: 34
Discharge: HOME | End: 2019-11-21
Payer: MEDICAID

## 2019-11-21 PROCEDURE — 97113 AQUATIC THERAPY/EXERCISES: CPT

## 2019-11-21 NOTE — TELEPHONE ENCOUNTER
Patient called to check the status of his work note . Elba Campoverde  Please advise     Patient tel : [phone]

## 2019-11-22 NOTE — TELEPHONE ENCOUNTER
Patient called to check the status of his work note  He states needs it in order to return to work  Monday.

## 2019-11-22 NOTE — TELEPHONE ENCOUNTER
Patient is aware that his note is ready for  at any office that is closest to him. It is also available on MyChart for him and patient is aware.

## 2019-11-26 ENCOUNTER — HOSPITAL ENCOUNTER (OUTPATIENT)
Dept: PHYSICAL THERAPY | Age: 34
Discharge: HOME | End: 2019-11-26
Payer: MEDICAID

## 2019-11-26 PROCEDURE — 97113 AQUATIC THERAPY/EXERCISES: CPT
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
## 2019-12-03 DIAGNOSIS — M79.604 BILATERAL LEG PAIN: ICD-10-CM

## 2019-12-03 DIAGNOSIS — M25.512 ACUTE PAIN OF LEFT SHOULDER: ICD-10-CM

## 2019-12-03 DIAGNOSIS — M79.605 BILATERAL LEG PAIN: ICD-10-CM

## 2019-12-03 DIAGNOSIS — S49.92XA INJURY OF LEFT CLAVICLE, INITIAL ENCOUNTER: ICD-10-CM

## 2019-12-03 DIAGNOSIS — T07.XXXA MULTIPLE ABRASIONS: ICD-10-CM

## 2019-12-05 ENCOUNTER — TELEPHONE (OUTPATIENT)
Dept: FAMILY MEDICINE CLINIC | Age: 34
End: 2019-12-05

## 2019-12-05 RX ORDER — OXYCODONE AND ACETAMINOPHEN 5; 325 MG/1; MG/1
1 TABLET ORAL
Qty: 42 TAB | Refills: 0 | Status: SHIPPED | OUTPATIENT
Start: 2019-12-05 | End: 2019-12-20 | Stop reason: SDUPTHER

## 2019-12-05 NOTE — TELEPHONE ENCOUNTER
I have reviewed the patients controlled substance prescription history, as maintained in the [facility]. There is no suspicious activity noted. Usage is consistent with current use of prescribed medications.

## 2019-12-05 NOTE — TELEPHONE ENCOUNTER
Pt called asking about refill request he put in on the 3rd please advise he wants a call when ready so he can  from pharmacy please advise [phone]

## 2019-12-06 NOTE — TELEPHONE ENCOUNTER
Patient verbalized understanding The patient has been re-examined and I agree with the above assessment or I updated with my findings.

## 2019-12-17 ENCOUNTER — HOSPITAL ENCOUNTER (OUTPATIENT)
Dept: PHYSICAL THERAPY | Age: 34
Discharge: HOME | End: 2019-12-17
Payer: MEDICAID

## 2019-12-17 PROCEDURE — 97112 NEUROMUSCULAR REEDUCATION: CPT

## 2019-12-17 NOTE — PROGRESS NOTES
PT DAILY TREATMENT NOTE 10-18    Patient Name: Heide Sandy  Date:2019  : 1985  [x]  Patient  Verified  Payor: [address] / Plan: [facility] / Product Type: Managed Care Medicaid /    In time:8;15  Out time: 9;00  Total Treatment Time (min): 45  Visit #: 4 of 10    Medicare/BCBS Only   Total Timed Codes (min):   1:1 Treatment Time:         Treatment Area: Pain in right knee [M25.561]  Pain in left knee [M25.562]  Left shoulder pain [M25.512]  Right ankle pain [M25.571]  Left ankle pain [M25.572]  Left wrist pain [M25.532]    SUBJECTIVE  Pain Level (0-10 scale): 4  Any medication changes, allergies to medications, adverse drug reactions, diagnosis change, or new procedure performed?: - No    - Yes (see summary sheet for update)  Subjective functional status/changes:   - No changes reported  Overall I feel stronger and more more mobile, but the pain pesists    OBJECTIVE      45 min Neuromuscular Re-education:  []  See flow sheet :   Rationale: increase strength, improve coordination and improve balance  to improve the patients ability to increase stability, ease of lifting and stairs       With   [] TE   [] TA   [] neuro   [] other: Patient Education: [x] Review HEP    [] Progressed/Changed HEP based on:   [] positioning   [] body mechanics   [] transfers   [] heat/ice application    [] other:      Other Objective/Functional Measures:   Gains: pain is still there, but is better able to tolerate now.   Reports feeling more mobile now  70% improved  Deficits: still having numbness in left LE, pain persists in bilateral ankles knees, and left wrist.    Pain Level (0-10 scale) post treatment: 4    ASSESSMENT/Changes in Function: see goals    Patient will continue to benefit from skilled PT services to modify and progress therapeutic interventions, address functional mobility deficits, address ROM deficits, address strength deficits, analyze and address soft tissue restrictions, analyze and cue movement patterns, analyze and modify body mechanics/ergonomics, assess and modify postural abnormalities, address imbalance/dizziness and instruct in home and community integration to attain remaining goals. []  See Plan of Care  []  See progress note/recertification  []  See Discharge Summary         Progress towards goals / Updated goals:  Goal: Pt to be able to  and squeeze toothpaste tube with left hand for ease with grooming in the morning. Status at last note/certification: limited a little. Goal Met  Goal: Pt to amb household distances with LRAD and minimal pain to be able to perform cooking and cleaning with ease. Status at last note/certification: Pt. Uses SPC with community ambulation and no AD while at home. Will use as needed and when ankle is feeling unstable. Progresing  Goal: Pt to report < 5/10 pain at worst to be able to sleep at least 6 hrs uninterrupted for restorative rest.  Status at last note/certification: Worst pain 6/10. Must take medication prior to bedtime in order to sleep. However due to pain on left neck, shoulder if he turns the wrong way it will wake him. But can return to sleep after a rest  Goal: Pt to report FOTO score of 49 pts to show improved function and quality of life.   Status at last note/certification: FOTO 45  Not met FOTO 45    PLAN  [x]  Upgrade activities as tolerated     []  Continue plan of care  []  Update interventions per flow sheet       []  Discharge due to:_  []  Other:_      Leyda Sotelo PTA 12/17/2019  8:23 AM    Future Appointments   Date Time Provider Desire Chan   12/20/2019  1:05 PM Livan Mccray MD [facility]   1/7/2020  7:40 AM Laney Saleh NP [address] [facility]

## 2019-12-17 NOTE — PROGRESS NOTES
[facility], [address] [place] [address]  [phone] fax    Progress Note  Patient name: Fan Jade Start of Care: 10/17/2019   Referral source: Devi Whitley MD : 1985   Medical/Treatment Diagnosis: Pain in right knee [M25.561]  Pain in left knee [M25.562]  Left shoulder pain [M25.512]  Right ankle pain [M25.571]  Left ankle pain [M25.572]  Left wrist pain [M25.532]  Payor: [facility] MEDICAID / Plan: [employer] / Product Type: Managed Care Medicaid /  Onset Date:2019     Prior Hospitalization: see medical history Provider#: [id number]   Medications: Verified on Patient Summary List    Comorbidities: none reported  Prior Level of Function: 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 Status at last note/certification: limited a little. Current: met  -Goal: Pt to amb household distances with LRAD and minimal pain to be able to perform cooking and cleaning with ease. Status at last note/certification: Pt. Uses SPC with community ambulation and no AD while at home. Current: progressing, will use SPC as needed and when ankle is feeling unstable  -Goal: Pt to report < 5/10 pain at worst to be able to sleep at least 6 hrs uninterrupted for restorative rest.  Status at last note/certification: Worst pain 6/10. Current: Must take medication prior to bedtime in order to sleep. However due to pain on left neck, shoulder if he turns the wrong way it will wake him. But can return to sleep after a rest  -Goal: Pt to report FOTO score of 49 pts to show improved function and quality of life.   Status at last note/certification: FOTO 45  Current: not met, FOTO 45, understandable given lack of adherence to therapy    Key Functional Changes:   Functional Gains: pain is still there, but is better able to tolerate now. Reports feeling more mobile now  Functional Deficits: still having numbness in left LE, pain persists in bilateral ankles knees, and left wrist.  Limited in liting with left UE, increasing pain if forearm in supinated position. Pain from mid thoracic distally yo thumb and index finger. Will be re-evaluated Friday by neurologist.  % improvement: 70%     Updated Goals: to be achieved in  4 weeks:  -Goal: Pt will perform 5 floor to waist lifts with 15# resistance and proper upright trunk posture to better facilitate 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 Status at last note/certification: floor to waist lifts not initiated due to pain/apprehension  -Goal: Pt to amb community distances without AD and minimal pain to be able to perform grocery shopping and attend social events with friends and family with improved ease and enjoyment. Status at last note/certification: will use SPC as needed and when ankle is feeling unstable  -Goal: Pt to report < 5/10 pain at worst to be able to sleep at least 6 hrs uninterrupted for restorative rest.  Status at last note/certification: Worst pain 6/10. Must take medication prior to bedtime in order to sleep. However due to pain on left neck, shoulder if he turns the wrong way it will wake him. But can return to sleep after a rest  -Goal: Pt to report FOTO score of 49 pts to show improved function and quality of life. Status at last note/certification: FOTO 45      ASSESSMENT/RECOMMENDATIONS:  Patient has attended 9 sessions of aquatic based Pt and 1 session of land based Pt.    [x]Continue therapy per initial plan/protocol at a frequency of  2-3 x per week for 10 sessions  [x]Continue therapy with the following recommended changes: Emphasize importance of regular therapy attendance  []Discontinue therapy progressing towards or have reached established goals  []Discontinue therapy due to lack of appreciable progress towards goals  []Discontinue therapy due to lack of attendance or compliance  []Await Physician's recommendations/decisions regarding therapy  []Other:________________________________________________________________    Thank you for this referral.   Yobany Cody 12/17/2019 8:43 AM  NOTE TO PHYSICIAN:  PLEASE COMPLETE THE ORDERS BELOW AND   FAX TO [facility]: [phone]  If you are unable to process this request in 24 hours please contact our office: [phone]  []  I have read the above report and request that my patient continue as recommended. []  I have read the above report and request that my patient continue therapy with the following changes/special instructions:________________________________________  []I have read the above report and request that my patient be discharged from therapy.     [de-identified] Signature:____________Date:_________TIME:________    [employer], Date and Time must be completed for valid certification **

## 2019-12-19 ENCOUNTER — HOSPITAL ENCOUNTER (OUTPATIENT)
Dept: PHYSICAL THERAPY | Age: 34
Discharge: HOME | End: 2019-12-19
Payer: MEDICAID

## 2019-12-19 PROCEDURE — 97112 NEUROMUSCULAR REEDUCATION: CPT

## 2019-12-19 NOTE — PROGRESS NOTES
PT DAILY TREATMENT NOTE 10-18    Patient Name: Fan Jade  Date:2019  : 1985  [x]  Patient  Verified  Payor: [place] / Plan: [facility] / Product Type: Managed Care Medicaid /    In time:4:13  Out time:4:53  Total Treatment Time (min): 40  Visit #: 1 of 10    Medicare/BCBS Only   Total Timed Codes (min):   1:1 Treatment Time:         Treatment Area: Pain in right knee [M25.561]  Pain in left knee [M25.562]  Left shoulder pain [M25.512]  Right ankle pain [M25.571]  Left ankle pain [M25.572]  Left wrist pain [M25.532]    SUBJECTIVE  Pain Level (0-10 scale): 4  Any medication changes, allergies to medications, adverse drug reactions, diagnosis change, or new procedure performed?: [x] No    [] Yes (see summary sheet for update)  Subjective functional status/changes:   [] No changes reported  \"I am thinking about moving over to [place]. \"    OBJECTIVE     40 min Neuromuscular Re-education:  [x]  See flow sheet :   Rationale: increase strength, improve coordination, improve balance and increase proprioception  to improve the patients ability to perform stair negotiation and functional mobility in the home/community with improved quadriceps control, improved ankle stability, and decreased falls risk. With   [] TE   [] TA   [x] neuro   [] other: Patient Education: [x] Review HEP    [] Progressed/Changed HEP based on:   [] positioning   [] body mechanics   [] transfers   [] heat/ice application    [] other:      Other Objective/Functional Measures: Introduced increased weight bearing interventions that focus on controlled ankle mobility     Pain Level (0-10 scale) post treatment: 4/5    ASSESSMENT/Changes in Function: Progressed today's interventions to include increased weight bearing, balance, and total body movements for improved strength and stability in standing positions with lifting/pulling.  Patient reports some increased pain in the right ankle with repeated ankle plantar flexion/ dorsiflexion but this pain resolves following intervention. He reports that he may be moving to a new apartment which may impact his therapy attendance at this location. Patient will continue to benefit from skilled PT services to modify and progress therapeutic interventions, address functional mobility deficits, address ROM deficits, address strength deficits, analyze and address soft tissue restrictions, analyze and cue movement patterns, analyze and modify body mechanics/ergonomics, assess and modify postural abnormalities and address imbalance/dizziness to attain remaining goals. 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 Status at last note/certification: floor to waist lifts not initiated due to pain/apprehension  Current:  -Goal: Pt to amb community distances without AD and minimal pain to be able to perform grocery shopping and attend social events with friends and family with improved ease and enjoyment. Status at last note/certification: will use SPC as needed and when ankle is feeling unstable  Current:  -Goal: Pt to report < 5/10 pain at worst to be able to sleep at least 6 hrs uninterrupted for restorative rest.  Status at last note/certification: Worst pain 6/10. Must take medication prior to bedtime in order to sleep. However due to pain on left neck, shoulder if he turns the wrong way it will wake him. But can return to sleep after a rest  Current:  -Goal: Pt to report FOTO score of 49 pts to show improved function and quality of life.   Status at last note/certification: FOTO 45  Current: reassess at MD note    PLAN  [x]  Upgrade activities as tolerated     [x]  Continue plan of care  []  Update interventions per flow sheet       []  Discharge due to:_  []  Other:_      Ferny Gómez 12/19/2019  9:26 AM    Future Appointments   Date Time Provider Desire Chan   12/19/2019  4:15 PM [facility] BRITT [facility]   12/20/2019  1:05 PM MD Martínez Lay   12/23/2019  5:00 PM Connie Vásquez [facility]   1/7/2020  7:40 AM Joshua David NP [address] [place] [address]

## 2019-12-20 ENCOUNTER — OFFICE VISIT (OUTPATIENT)
Dept: ORTHOPEDIC SURGERY | Age: 34
End: 2019-12-20

## 2019-12-20 VITALS
HEART RATE: 76 BPM | RESPIRATION RATE: 16 BRPM | WEIGHT: 200 LBS | HEIGHT: 73 IN | DIASTOLIC BLOOD PRESSURE: 79 MMHG | OXYGEN SATURATION: 99 % | SYSTOLIC BLOOD PRESSURE: 119 MMHG | BODY MASS INDEX: 26.51 KG/M2

## 2019-12-20 DIAGNOSIS — S49.92XA INJURY OF LEFT CLAVICLE, INITIAL ENCOUNTER: ICD-10-CM

## 2019-12-20 DIAGNOSIS — M25.512 ACUTE PAIN OF LEFT SHOULDER: ICD-10-CM
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
## 2019-12-20 DIAGNOSIS — S13.130S: ICD-10-CM

## 2019-12-20 DIAGNOSIS — S16.1XXS STRAIN OF NECK MUSCLE, SEQUELA: ICD-10-CM

## 2019-12-20 DIAGNOSIS — T07.XXXA MULTIPLE ABRASIONS: ICD-10-CM

## 2019-12-20 DIAGNOSIS — S16.1XXA STRAIN OF NECK MUSCLE, INITIAL ENCOUNTER: ICD-10-CM

## 2019-12-20 DIAGNOSIS — M79.605 BILATERAL LEG PAIN: ICD-10-CM

## 2019-12-20 DIAGNOSIS — M79.604 BILATERAL LEG PAIN: ICD-10-CM

## 2019-12-20 DIAGNOSIS — M54.12 CERVICAL NEURITIS: Primary | ICD-10-CM

## 2019-12-20 RX ORDER — GABAPENTIN 300 MG/1
300 CAPSULE ORAL 3 TIMES DAILY
Qty: 90 CAP | Refills: 1 | Status: SHIPPED | OUTPATIENT
Start: 2019-12-20 | End: 2021-02-04 | Stop reason: SDUPTHER

## 2019-12-20 RX ORDER — IBUPROFEN 800 MG/1
800 TABLET ORAL
Qty: 90 TAB | Refills: 0 | Status: SHIPPED | OUTPATIENT
Start: 2019-12-20 | End: 2021-02-04 | Stop reason: SDUPTHER

## 2019-12-20 NOTE — LETTER
NOTIFICATION RETURN TO WORK / SCHOOL 
 
12/20/2019 1:43 PM 
 
[facility] [address] To Whom It May Concern: 
 
Ida Lopez is currently under the care of [address]. Mr. Ninfa Ames may return to work on light duty, until his follow up appointment on 01/17/2020, at which time his work status will be re-evaluated. If there are questions or concerns please have the patient contact our office. Sincerely, Liset Scott MD
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
## 2019-12-20 NOTE — LETTER
12/20/19 Patient: Smith Morales YOB: 1985 Date of Visit: 12/20/2019 Donnell Garg NP 
[address] [address] VIA In Basket Dear Donnell Garg NP, Thank you for referring Mr. Smith Morales to [address] for evaluation. My notes for this consultation are attached. If you have questions, please do not hesitate to call me. I look forward to following your patient along with you. Sincerely, Gabrielle Carrizales MD

## 2019-12-23 ENCOUNTER — HOSPITAL ENCOUNTER (OUTPATIENT)
Dept: PHYSICAL THERAPY | Age: 34
Discharge: HOME | End: 2019-12-23
Payer: MEDICAID

## 2019-12-23 PROCEDURE — 97112 NEUROMUSCULAR REEDUCATION: CPT
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
## 2019-12-27 RX ORDER — OXYCODONE AND ACETAMINOPHEN 5; 325 MG/1; MG/1
1 TABLET ORAL
Qty: 42 TAB | Refills: 0 | Status: SHIPPED | OUTPATIENT
Start: 2019-12-27 | End: 2020-01-13 | Stop reason: SDUPTHER
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
## 2020-01-17 ENCOUNTER — OFFICE VISIT (OUTPATIENT)
Dept: ORTHOPEDIC SURGERY | Age: 35
End: 2020-01-17

## 2020-01-17 NOTE — PROGRESS NOTES
A user error has taken place: encounter opened in error, closed for administrative reasons. Patient's insurance is showing ineligible. Patient did not want to sign paperwork stating \"Self-Pay\". Patient will call back to reschedule.

## 2020-01-17 NOTE — LETTER
1/17/20 Patient: Anya Padilla YOB: 1985 Date of Visit: 1/17/2020 Hima Medina NP 
[address] [address] VIA In Basket Dear Hima Medina NP, Thank you for referring Mr. Anya Padilla to [address] for evaluation. My notes for this consultation are attached. If you have questions, please do not hesitate to call me. I look forward to following your patient along with you. Sincerely, Cira Smith MD
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
## 2020-01-31 ENCOUNTER — OFFICE VISIT (OUTPATIENT)
Dept: FAMILY MEDICINE CLINIC | Age: 35
End: 2020-01-31

## 2021-02-04 ENCOUNTER — VIRTUAL VISIT (OUTPATIENT)
Dept: FAMILY MEDICINE CLINIC | Age: 36
End: 2021-02-04
Payer: MEDICAID

## 2021-02-04 DIAGNOSIS — S16.1XXS STRAIN OF NECK MUSCLE, SEQUELA: ICD-10-CM

## 2021-02-04 DIAGNOSIS — M25.572 BILATERAL ANKLE PAIN, UNSPECIFIED CHRONICITY: Primary | ICD-10-CM

## 2021-02-04 DIAGNOSIS — S13.130S: ICD-10-CM

## 2021-02-04 DIAGNOSIS — M54.12 CERVICAL NEURITIS: ICD-10-CM

## 2021-02-04 DIAGNOSIS — M25.571 BILATERAL ANKLE PAIN, UNSPECIFIED CHRONICITY: Primary | ICD-10-CM

## 2021-02-04 PROCEDURE — 99212 OFFICE O/P EST SF 10 MIN: CPT | Performed by: NURSE PRACTITIONER

## 2021-02-04 RX ORDER — IBUPROFEN 800 MG/1
800 TABLET ORAL
Qty: 90 TAB | Refills: 0 | Status: SHIPPED | OUTPATIENT
Start: 2021-02-04 | End: 2021-02-17

## 2021-02-04 RX ORDER — GABAPENTIN 300 MG/1
CAPSULE ORAL
Qty: 111 CAP | Refills: 0 | Status: SHIPPED | OUTPATIENT
Start: 2021-02-04 | End: 2021-03-20
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
## 2021-02-04 NOTE — Clinical Note
Patient needs in office appt.   Patient will be moving to [facility] 3/1/2021 for work

## 2021-02-08 ENCOUNTER — HOSPITAL ENCOUNTER (OUTPATIENT)
Dept: GENERAL RADIOLOGY | Age: 36
Discharge: HOME | End: 2021-02-08
Payer: COMMERCIAL

## 2021-02-08 DIAGNOSIS — M25.572 BILATERAL ANKLE PAIN, UNSPECIFIED CHRONICITY: ICD-10-CM

## 2021-02-08 DIAGNOSIS — M25.571 BILATERAL ANKLE PAIN, UNSPECIFIED CHRONICITY: ICD-10-CM

## 2021-02-08 PROCEDURE — 73600 X-RAY EXAM OF ANKLE: CPT

## 2021-02-12 ENCOUNTER — HOSPITAL ENCOUNTER (OUTPATIENT)
Dept: GENERAL RADIOLOGY | Age: 36
Discharge: HOME | End: 2021-02-12
Payer: COMMERCIAL

## 2021-02-12 ENCOUNTER — OFFICE VISIT (OUTPATIENT)
Dept: FAMILY MEDICINE CLINIC | Age: 36
End: 2021-02-12
Payer: COMMERCIAL

## 2021-02-12 VITALS
DIASTOLIC BLOOD PRESSURE: 79 MMHG | TEMPERATURE: 97.1 F | WEIGHT: 200 LBS | RESPIRATION RATE: 20 BRPM | OXYGEN SATURATION: 98 % | HEART RATE: 71 BPM | HEIGHT: 73 IN | SYSTOLIC BLOOD PRESSURE: 121 MMHG | BODY MASS INDEX: 26.51 KG/M2

## 2021-02-12 DIAGNOSIS — M79.674 GREAT TOE PAIN, RIGHT: Primary | ICD-10-CM

## 2021-02-12 DIAGNOSIS — M54.42 LEFT-SIDED LOW BACK PAIN WITH LEFT-SIDED SCIATICA, UNSPECIFIED CHRONICITY: ICD-10-CM

## 2021-02-12 DIAGNOSIS — M79.674 GREAT TOE PAIN, RIGHT: ICD-10-CM

## 2021-02-12 DIAGNOSIS — M25.571 BILATERAL ANKLE PAIN, UNSPECIFIED CHRONICITY: ICD-10-CM

## 2021-02-12 DIAGNOSIS — T81.89XA RETAINED SUTURE, INITIAL ENCOUNTER: ICD-10-CM

## 2021-02-12 DIAGNOSIS — M89.9 BONE LESION: ICD-10-CM

## 2021-02-12 DIAGNOSIS — M25.572 BILATERAL ANKLE PAIN, UNSPECIFIED CHRONICITY: ICD-10-CM

## 2021-02-12 DIAGNOSIS — Z18.9 RETAINED SUTURE, INITIAL ENCOUNTER: ICD-10-CM

## 2021-02-12 PROCEDURE — 73590 X-RAY EXAM OF LOWER LEG: CPT

## 2021-02-12 PROCEDURE — 73660 X-RAY EXAM OF TOE(S): CPT

## 2021-02-12 PROCEDURE — 99214 OFFICE O/P EST MOD 30 MIN: CPT | Performed by: NURSE PRACTITIONER

## 2021-02-12 RX ORDER — ARM BRACE
EACH MISCELLANEOUS
Qty: 1 EACH | Refills: 0 | Status: SHIPPED | OUTPATIENT
Start: 2021-02-12

## 2021-02-12 RX ORDER — ARM BRACE
EACH MISCELLANEOUS
Qty: 1 EACH | Refills: 0 | Status: SHIPPED | OUTPATIENT
Start: 2021-02-12 | End: 2021-02-12

## 2021-02-12 NOTE — PROGRESS NOTES
Сергей Travis is a 37 y.o. male who was seen in clinic today (2/12/2021) for Other (Requesting referral to PT)  . Assessment & Plan:   Diagnoses and all orders for this visit:    1. Great toe pain, right  -     XR GREAT TOE RT MIN 2 V; Future    2. Bone lesion  -     XR TIB/FIB LT; Future    3. Bilateral ankle pain, unspecified chronicity  -     REFERRAL TO ORTHOPEDICS    4. Retained suture, initial encounter  -     REFERRAL TO GENERAL SURGERY    5. Left-sided low back pain with left-sided sciatica, unspecified chronicity    Other orders  -     Back Brace misc; Wear as needed for pain         I have discussed the diagnosis with the patient and the intended plan as seen in the above orders. The patient has received an after-visit summary and questions were answered concerning future plans. I have discussed medication side effects and warnings with the patient as well. Patient agreeable with above plan and verbalizes understanding. Follow-up and Dispositions    · Return if symptoms worsen or fail to improve. Subjective:   Patient reports he did not  prescribed medication as he thought provider was going to call him when prescriptions were ready for pickup. States he will  today. Patient requesting to have x-ray of right great toe performed due to continued pain. Further reports he has lower back pain has tried over-the-counter brace without improvement. 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 No results found for this or any previous visit. Prior to Admission medications    Medication Sig Start Date End Date Taking? Authorizing Provider   ibuprofen (MOTRIN) 800 mg tablet Take 1 Tab by mouth three (3) times daily (with meals). 2/4/21   Collin HAHN NP   gabapentin (Neurontin) 300 mg capsule Take 1 Cap by mouth daily for 7 days, THEN 1 Cap two (2) times a day for 7 days, THEN 1 Cap three (3) times daily for 30 days.  Max Daily Amount: 900 mg. 2/4/21 3/20/21  Priscella Michael HAHN, NP   cyclobenzaprine (FLEXERIL) 10 mg tablet Take 1 Tab by mouth three (3) times daily as needed for Muscle Spasm(s). 10/1/19   Natalie Cuello NP         The following sections were reviewed & updated as appropriate: PMH, PSH, FH, and SH. Review of Systems   Musculoskeletal: Positive for back pain (lower) and joint pain (right great toe, bilateral ankles). Objective:     Visit Vitals  /79 (BP 1 Location: Left arm, BP Patient Position: Sitting, BP Cuff Size: Adult)   Pulse 71   Temp 97.1 °F (36.2 °C) (Temporal)   Resp 20   Ht 6' 1\" (1.854 m)   Wt 200 lb (90.7 kg)   SpO2 98%   BMI 26.39 kg/m²      Physical Exam  Constitutional:       Appearance: He is well-developed. HENT:      Head: Normocephalic and atraumatic. Neck:      Musculoskeletal: Normal range of motion and neck supple. Cardiovascular:      Rate and Rhythm: Normal rate and regular rhythm. Heart sounds: Normal heart sounds. No murmur. No friction rub. No gallop. Pulmonary:      Effort: Pulmonary effort is normal.      Breath sounds: Normal breath sounds. No wheezing, rhonchi or rales. Musculoskeletal:        Legs:    Feet:      Comments: Right great toe tender to palpation  Skin:     General: Skin is warm and dry. Neurological:      Mental Status: He is alert and oriented to person, place, and time. Disclaimer: The patient understands our medical plan. Alternatives have been explained and offered. The risks, benefits and significant side effects of all medications have been reviewed. Anticipated time course and progression of condition reviewed. All questions have been addressed. He is encouraged to employ the information provided in the after visit summary, which was reviewed.       Where applicable, he is instructed to call the clinic if he has not been notified either by phone or through [address] with the results of his tests or with an appointment plan for any referrals within 1 week(s). No news is not good news; it's no news. The patient  is to call if his condition worsens or fails to improve or if significant side effects are experienced. Aspects of this note may have been generated using voice recognition software. Despite editing, there may be unrecognized errors.        Sammi Lam NP

## 2021-02-17 ENCOUNTER — OFFICE VISIT (OUTPATIENT)
Dept: ORTHOPEDIC SURGERY | Age: 36
End: 2021-02-17
Payer: COMMERCIAL

## 2021-02-17 VITALS
DIASTOLIC BLOOD PRESSURE: 72 MMHG | WEIGHT: 201 LBS | HEART RATE: 90 BPM | BODY MASS INDEX: 26.64 KG/M2 | RESPIRATION RATE: 20 BRPM | TEMPERATURE: 97 F | HEIGHT: 73 IN | SYSTOLIC BLOOD PRESSURE: 113 MMHG | OXYGEN SATURATION: 97 %

## 2021-02-17 DIAGNOSIS — G89.29 CHRONIC PAIN OF LEFT ANKLE: ICD-10-CM

## 2021-02-17 DIAGNOSIS — G89.29 CHRONIC PAIN OF RIGHT ANKLE: ICD-10-CM

## 2021-02-17 DIAGNOSIS — S93.492S SPRAIN OF ANTERIOR TALOFIBULAR LIGAMENT OF LEFT ANKLE, SEQUELA: ICD-10-CM

## 2021-02-17 DIAGNOSIS — M79.672 LEFT FOOT PAIN: ICD-10-CM

## 2021-02-17 DIAGNOSIS — M25.571 CHRONIC PAIN OF RIGHT ANKLE: ICD-10-CM

## 2021-02-17 DIAGNOSIS — M25.572 CHRONIC PAIN OF LEFT ANKLE: ICD-10-CM

## 2021-02-17 DIAGNOSIS — M25.371 INSTABILITY OF RIGHT ANKLE JOINT: ICD-10-CM

## 2021-02-17 DIAGNOSIS — M25.371 INSTABILITY OF RIGHT ANKLE JOINT: Primary | ICD-10-CM

## 2021-02-17 DIAGNOSIS — M79.671 RIGHT FOOT PAIN: ICD-10-CM

## 2021-02-17 DIAGNOSIS — S93.491S SPRAIN OF ANTERIOR TALOFIBULAR LIGAMENT OF RIGHT ANKLE, SEQUELA: ICD-10-CM

## 2021-02-17 PROCEDURE — 99204 OFFICE O/P NEW MOD 45 MIN: CPT | Performed by: ORTHOPAEDIC SURGERY

## 2021-02-17 PROCEDURE — 73600 X-RAY EXAM OF ANKLE: CPT | Performed by: ORTHOPAEDIC SURGERY

## 2021-02-17 PROCEDURE — 73630 X-RAY EXAM OF FOOT: CPT | Performed by: ORTHOPAEDIC SURGERY

## 2021-02-17 RX ORDER — FAMOTIDINE 40 MG/1
40 TABLET, FILM COATED ORAL DAILY
Qty: 30 TAB | Refills: 0 | Status: SHIPPED | OUTPATIENT
Start: 2021-02-17

## 2021-02-17 RX ORDER — MELOXICAM 15 MG/1
15 TABLET ORAL DAILY
Qty: 30 TAB | Refills: 2 | Status: SHIPPED | OUTPATIENT
Start: 2021-02-17
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
## 2021-02-17 NOTE — LETTER
2/17/2021 Patient: Sae Beltran YOB: 1985 Date of Visit: 2/17/2021 Maribell Bradshaw NP 
[address] [facility] [address] [facility] [address] [address] [employer] Dear Maribell Bradshaw NP, Thank you for referring Mr. Sae Beltran to [address] [place] [address] for evaluation. My notes for this consultation are attached. If you have questions, please do not hesitate to call me. I look forward to following your patient along with you.  
 
 
Sincerely, 
 
Ton Calle MD

## 2021-02-26 ENCOUNTER — OFFICE VISIT (OUTPATIENT)
Dept: SURGERY | Age: 36
End: 2021-02-26
Payer: COMMERCIAL

## 2021-02-26 VITALS
HEART RATE: 68 BPM | TEMPERATURE: 97.4 F | BODY MASS INDEX: 26.11 KG/M2 | DIASTOLIC BLOOD PRESSURE: 82 MMHG | SYSTOLIC BLOOD PRESSURE: 122 MMHG | RESPIRATION RATE: 18 BRPM | OXYGEN SATURATION: 99 % | HEIGHT: 73 IN | WEIGHT: 197 LBS

## 2021-02-26 DIAGNOSIS — S80.852A FOREIGN BODY OF LEFT LOWER LEG, INITIAL ENCOUNTER: Primary | ICD-10-CM

## 2021-02-26 PROCEDURE — 99205 OFFICE O/P NEW HI 60 MIN: CPT | Performed by: SURGERY

## 2021-02-26 PROCEDURE — 10121 INC&RMVL FB SUBQ TISS COMP: CPT | Performed by: SURGERY

## 2021-02-26 NOTE — PROGRESS NOTES
[facility]  OFFICE PROCEDURE PROGRESS NOTE        Chart reviewed for the following:   Ernst Galeazzi, MD, have reviewed the History, Physical and updated the Allergic reactions for Munir CHAVEZ OUT performed immediately prior to start of procedure:   Ernst Galeazzi, MD, have performed the following reviews on Elsa Carroll prior to the start of the procedure:            * Patient was identified by name and date of birth   * Agreement on procedure being performed was verified  * Risks and Benefits explained to the patient  * Procedure site verified and marked as necessary  * Patient was positioned for comfort  * Consent was signed and verified     Time: 1359 hrs      Date of procedure: 2/26/2021    Procedure performed by:  Gregor Hannon MD    Provider assisted by: Stacy Meng MA    Patient assisted by: self    How tolerated by patient: tolerated the procedure well with no complications    Post Procedural Pain Scale: 0 - No Hurt    Comments: none

## 2021-02-26 NOTE — PATIENT INSTRUCTIONS
Object in the Skin: Care Instructions Your Care Instructions Small objects (splinters) of wood, metal, glass, or plastic can become embedded in the skin. Thorns from [employer] and other plants also can prick or become stuck in the skin. Splinters can cause an infection if they are not removed. Your doctor probably removed the object and cleaned the skin well. Your doctor may have given you antibiotics to prevent infection and a tetanus shot if you had not had one in the last 5 years or do not know when you had your last one. For a few days, you may have pain and itching in the wound where the object was removed. Follow-up care is a key part of your treatment and safety. Be sure to make and go to all appointments, and call your doctor if you are having problems. It's also a good idea to know your test results and keep a list of the medicines you take. How can you care for yourself at home? · If your doctor told you how to care for your wound, follow your doctor's instructions. If you did not get instructions, follow this general advice: 
? Wash the wound with clean water 2 times a day. Don't use hydrogen peroxide or alcohol, which can slow healing. ? You may cover the wound with a thin layer of petroleum jelly, such as Vaseline, and a nonstick bandage. ? Apply more petroleum jelly and replace the bandage as needed. · Your doctor may have used medicine to numb your skin. When it wears off, your pain may return. Take an over-the-counter pain medicine, such as acetaminophen (Tylenol), ibuprofen (Advil, Motrin), or naproxen (Aleve). Read and follow all instructions on the label. · Do not take two or more pain medicines at the same time unless the doctor told you to. Many pain medicines have acetaminophen, which is Tylenol. Too much acetaminophen (Tylenol) can be harmful. · If your doctor prescribed antibiotics, take them as directed. Do not stop taking them just because you feel better.  You need to take the full course of antibiotics. · After 2 or 3 days, if your swelling is gone, apply a heating pad set on low or a warm cloth to your wound area. Some doctors suggest that you go back and forth between hot and cold. Put a thin cloth between the heating pad and your skin. · It may help to prop up the affected part of your body on a pillow anytime you sit or lie down during the next 3 days. Try to keep it above the level of your heart. This will help reduce swelling. · Your wound may itch or feel irritated. A little redness and swelling is normal. Do not scratch or rub the wound. When should you call for help? Call your doctor now or seek immediate medical care if: 
  · The skin near the wound is cool or pale or changes color.  
  · You have tingling, weakness, or numbness in the area near the wound.  
  · The wound starts to bleed, and blood soaks the bandage. Oozing small amounts of blood is normal.  
  · You have trouble moving a limb near the wound.  
  · You have signs of infection, such as: 
? Increased pain, swelling, warmth, or redness. ? Red streaks leading from the wound. ? Pus draining from the wound. ? A fever. Watch closely for changes in your health, and be sure to contact your doctor if: 
  · You do not get better as expected. Where can you learn more? Go to http://www.gray.com/ Enter Chaney Schaumann in the search box to learn more about \"Object in the Skin: Care Instructions. \" Current as of: June 26, 2019               Content Version: 12.6 © 9399-3108 [employer]. Care instructions adapted under license by [employer] (which disclaims liability or warranty for this information). If you have questions about a medical condition or this instruction, always ask your healthcare professional. [address] any warranty or liability for your use of this information.
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
## 2021-02-26 NOTE — PROGRESS NOTES
Ivana Sneed    2/26/2021    Preoperative Dx: Retained foreign body left leg    Postoperative Dx: Same     Procedure: Excisional biopsy of retained foreign body left leg    Surgeon:  Marielena Brandt MD    Assistant: Geoff Soto MA    Anesthesia:  Local - 1% lidocaine with epinephrine    Findings:   Black braided suture consistent with silk    Specimen: None    EBL: 5 mL    Complications:  None    Brief Operative Indication:   Retained foreign body left leg    Description of Procedure:  Informed consent was obtained from the patient. Time out was performed to insure correct procedure. The patient was positioned supine on the table. The skin was prepped with betadine and sterile drape applied. The local anesthetic was infiltrated into the skin and subcutaneous tissues. Once the tissues were numb,  The #11 blade was used to create a linear incision 1 cm in length overlying the foreign body. The iris scissors and forceps were used to dissect the subcutaneous tissue containing the suture away from the wound. Pressure was used for hemostasis. 3-0 Vicryl suture was used to create 2 vertical mattress sutures in the skin to reapproximate the skin edges. A sterile dressing was applied. He tolerated the procedure well. He was stable upon exiting the office.
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
## 2021-03-06 ENCOUNTER — HOSPITAL ENCOUNTER (OUTPATIENT)
Dept: MRI IMAGING | Age: 36
Discharge: HOME | End: 2021-03-06
Attending: ORTHOPAEDIC SURGERY
Payer: COMMERCIAL

## 2021-03-06 PROCEDURE — 73721 MRI JNT OF LWR EXTRE W/O DYE: CPT

## 2021-03-12 ENCOUNTER — OFFICE VISIT (OUTPATIENT)
Dept: SURGERY | Age: 36
End: 2021-03-12
Payer: COMMERCIAL

## 2021-03-12 VITALS
RESPIRATION RATE: 18 BRPM | BODY MASS INDEX: 26.24 KG/M2 | WEIGHT: 198 LBS | DIASTOLIC BLOOD PRESSURE: 86 MMHG | HEART RATE: 72 BPM | TEMPERATURE: 97.7 F | OXYGEN SATURATION: 98 % | SYSTOLIC BLOOD PRESSURE: 142 MMHG | HEIGHT: 73 IN

## 2021-03-12 DIAGNOSIS — Z09 POSTOPERATIVE EXAMINATION: Primary | ICD-10-CM

## 2021-03-12 PROCEDURE — 99024 POSTOP FOLLOW-UP VISIT: CPT | Performed by: SURGERY

## 2021-03-12 NOTE — PROGRESS NOTES
Lasha Ansari is a 37 y.o. male  Chief Complaint   Patient presents with    Follow-up     2/26/21 excisonal biopsy of retained foreign body/suture         Is someone accompanying this pt? no    Is the patient using any DME equipment during OV? no        Vitals:    03/12/21 1020   BP: (!) 142/86   Pulse: 72   Resp: 18   Temp: 97.7 °F (36.5 °C)   SpO2: 98%   Weight: 198 lb (89.8 kg)   Height: 6' 1\" (1.854 m)    Mr. Ayse Fisher has been given the following recommendations today due to his elevated BP reading: referred to Alternative/PCP. Depression Screening:  3 most recent PHQ Screens 2/12/2021   Little interest or pleasure in doing things Not at all   Feeling down, depressed, irritable, or hopeless Not at all   Total Score PHQ 2 0   Trouble falling or staying asleep, or sleeping too much -   Feeling tired or having little energy -   Poor appetite, weight loss, or overeating -   Feeling bad about yourself - or that you are a failure or have let yourself or your family down -   Trouble concentrating on things such as school, work, reading, or watching TV -   Moving or speaking so slowly that other people could have noticed; or the opposite being so fidgety that others notice -   Thoughts of being better off dead, or hurting yourself in some way -   PHQ 9 Score -   How difficult have these problems made it for you to do your work, take care of your home and get along with others -       Learning Assessment:  Learning Assessment 10/1/2019   PRIMARY LEARNER Patient   PRIMARY LANGUAGE ENGLISH   LEARNER PREFERENCE PRIMARY LISTENING   ANSWERED BY patient   RELATIONSHIP SELF         Fall Risk  Fall Risk Assessment, last 12 mths 2/17/2021   Able to walk? Yes   Fall in past 12 months? 0   Do you feel unsteady? 1   Are you worried about falling 1   Is TUG test greater than 12 seconds? 0   Is the gait abnormal? 0       Health Maintenance reviewed and discussed and ordered per Provider. Coordination of Care:  1.  Have you been to the ER, urgent care clinic since your last visit? Hospitalized since your last visit? no    2. Have you seen or consulted any other health care providers outside of the [address] [place] [address] since your last visit? Include any pap smears or colon screening.  no

## 2021-03-28 NOTE — PROGRESS NOTES
Edouard Mccormack M.D. FACS  PROGRESS NOTE        Subjective:  Patient presents today without complaints      Objective:  Vitals:    03/12/21 1020   BP: (!) 142/86   Pulse: 72   Resp: 18   Temp: 97.7 °F (36.5 °C)   SpO2: 98%   Weight: 89.8 kg (198 lb)   Height: 6' 1\" (1.854 m)       Physical Exam:    General: in no apparent distress    Skin: The left leg incision is healing well. Sutures were removed. The wound remained well approximated. Current Medications:  Current Outpatient Medications   Medication Sig Dispense Refill    meloxicam (MOBIC) 15 mg tablet Take 1 Tab by mouth daily. 30 Tab 2    famotidine (PEPCID) 40 mg tablet Take 1 Tab by mouth daily. 30 Tab 0    Back Brace misc Wear as needed for pain 1 Each 0    cyclobenzaprine (FLEXERIL) 10 mg tablet Take 1 Tab by mouth three (3) times daily as needed for Muscle Spasm(s). 60 Tab 1       Chart and notes reviewed. Data reviewed. I have evaluated and examined the patient.         Impression and Plan:  The patient is 2 weeks out from excision of foreign body from the left leg  Follow-up as needed     Maryruth Dakin, MD

## 2021-05-19 ENCOUNTER — VIRTUAL VISIT (OUTPATIENT)
Dept: FAMILY MEDICINE CLINIC | Age: 36
End: 2021-05-19

## 2021-05-19 DIAGNOSIS — Z91.199 NO-SHOW FOR APPOINTMENT: Primary | ICD-10-CM

## 2021-06-02 ENCOUNTER — TELEPHONE (OUTPATIENT)
Dept: FAMILY MEDICINE CLINIC | Age: 36
End: 2021-06-02

## 2021-06-02 NOTE — TELEPHONE ENCOUNTER
Anuradha from [facility] called requesting la paperwork that was signed by provider back in October 2019. Says she will be sending over signed authorization release form from patient.

## 2021-10-12 ENCOUNTER — TELEPHONE (OUTPATIENT)
Dept: FAMILY MEDICINE CLINIC | Age: 36
End: 2021-10-12

## 2021-10-12 DIAGNOSIS — Z01.82 ENCOUNTER FOR ALLERGY TESTING: Primary | ICD-10-CM

## 2021-10-12 NOTE — TELEPHONE ENCOUNTER
Left message for patient to return call to the office. May need be referred to allergist for testing. No documentation in chart in regards to allergy to eggs.  Per chart review patient received flu vaccination in March 2021

## 2021-10-12 NOTE — TELEPHONE ENCOUNTER
----- Message -----   From: Misah Colon   Sent: 10/12/2021  10:27 AM EDT   To: [facility]     Patient requesting letter in regards to having allergies to eggs. ... [phone]

## 2021-10-13 NOTE — TELEPHONE ENCOUNTER
Referral to Allergy   Dr. Henderson Pi   Dx encounter for allergy testing. Per verbalized understanding Clark Gutierres NP-C  Need to verify egg allergy/allergy to [place] vaccination. Needs appointment within the next 1-2 weeks. Once appointment has been scheduled. The provider will write a note to give excuse until the appointment.

## 2021-10-13 NOTE — TELEPHONE ENCOUNTER
[facility]: Gallo Shah MD  3.9  7 [employer] reviews  Allergist in [place], [place]  Address: [address], [address] [place] [address] Street  Hours:   Mari Diehl soon ?  9AM  Phone: [phone]

## 2021-10-13 NOTE — TELEPHONE ENCOUNTER
Patient aware. States he will set up his appointment with allergy. Advised patient that he can get the vaccination with an egg allergy. The egg component is not in COVID vaccination. He verbalized understanding. Will still make appointment with allergist or figure out another exemption for the COVID vaccination.

## 2022-12-07 LAB — SARS-COV-2: NOT DETECTED
